# Patient Record
Sex: FEMALE | Race: WHITE | NOT HISPANIC OR LATINO | Employment: UNEMPLOYED | URBAN - METROPOLITAN AREA
[De-identification: names, ages, dates, MRNs, and addresses within clinical notes are randomized per-mention and may not be internally consistent; named-entity substitution may affect disease eponyms.]

---

## 2022-08-04 ENCOUNTER — SPECIALTY PHARMACY (OUTPATIENT)
Dept: PHARMACY | Facility: CLINIC | Age: 32
End: 2022-08-04

## 2022-08-04 NOTE — TELEPHONE ENCOUNTER
Crissy Obrien, this is Charity Villareal with [facility].  We are working on your prescription that your doctor has sent us. We will be working with your insurance to get this approved for you. We will be calling you along the way with updates on your medication.  If you have any questions, you can reach us at [phone].    Welcome call outcome: Left voicemail     PA questions answered, waiting on response

## 2022-08-05 NOTE — TELEPHONE ENCOUNTER
-PA approved from 08/04/2022 to 02/04/2023  Case ID: [id number]    Benefits Investigation      Insurance: Medicaid  Copay: $0.00

## 2022-08-08 ENCOUNTER — SPECIALTY PHARMACY (OUTPATIENT)
Dept: PHARMACY | Facility: CLINIC | Age: 32
End: 2022-08-08

## 2022-08-08 ENCOUNTER — PATIENT MESSAGE (OUTPATIENT)
Dept: PHARMACY | Facility: CLINIC | Age: 32
End: 2022-08-08

## 2022-08-08 DIAGNOSIS — M05.20 RHEUMATOID ARTERITIS: Primary | ICD-10-CM

## 2022-08-08 NOTE — TELEPHONE ENCOUNTER
Specialty Pharmacy - Initial Clinical Assessment    Specialty Medication Orders Linked to Encounter    Flowsheet Row Most Recent Value   Medication #1 adalimumab (HUMIRA,CF, PEN) 40 mg/0.4 mL PnKt (Order#[id number], Rx#[id number])        Patient Diagnosis   Rheumatoid arthritis    Subjective    Michela Beverly is a 31 y.o. female, who is followed by the specialty pharmacy service for management and education.    Recent Encounters     Date Type Provider Description    08/08/2022 Specialty Pharmacy Charity Villareal PharmD Initial Clinical Assessment    08/04/2022 Specialty Pharmacy Charity Villareal PharmD Referral Authorization        Clinical call attempts since last clinical assessment   8/5/2022  7:58 PM - Specialty Pharmacy - Clinical Assessment by Charity Villareal PharmD     Current Outpatient Medications   Medication Sig    adalimumab (HUMIRA,CF, PEN) 40 mg/0.4 mL PnKt Inject 0.4 mLs (40 mg total) into the skin every 14 (fourteen) days.    EScitalopram oxalate (LEXAPRO) 20 MG tablet Take 20 mg by mouth once daily.    ESTARYLLA 0.25-35 mg-mcg per tablet Take 1 tablet by mouth once daily.    folic acid (FOLVITE) 1 MG tablet Take 1 tablet (1 mg total) by mouth once daily.    GRALISE 600 mg Tb24 Take 1 tablet by mouth once daily.    methIMAzole (TAPAZOLE) 2.5 mg Tab     methotrexate 2.5 MG Tab Take 6 tablets (15 mg total) by mouth every 7 days.    naproxen (NAPROSYN) 500 MG tablet Take 1 tablet (500 mg total) by mouth 2 (two) times daily with meals.   Last reviewed on 8/8/2022  9:08 AM by Charity Villareal PharmD    Review of patient's allergies indicates:   Allergen Reactions    Lavender Hives and Shortness Of Breath   Last reviewed on  8/8/2022 9:08 AM by Charity Villareal    Drug Interactions    Drug interactions evaluated: yes  Clinically relevant drug interactions identified: no  Provided the patient with educational material regarding drug interactions: not applicable         Adverse Effects    Arthralgias: Pos  Back pain:  Pos  Gait problem: Pos  Joint swelling: Pos  Myalgias: Pos  Neck pain: Pos  Neck stiffness: Pos       Assessment Questions - Documented Responses    Flowsheet Row Most Recent Value   Assessment    Medication Reconciliation completed for patient Yes   During the past 4 weeks, has patient missed any activities due to condition or medication? Missed Work, Missed Planned Activities   Number of Days missed 4   During the past 4 weeks, did patient have any of the following urgent care visits? None   Goals of Therapy Status Discussed (new start)   Status of the patients ability to self-administer: Is Able   All education points have been covered with patient? Yes, supplemental printed education provided   Welcome packet contents reviewed and discussed with patient? Yes   Assesment completed? Yes   Plan Therapy being initiated   Do you need to open a clinical intervention (i-vent)? No   Do you want to schedule first shipment? Yes        Refill Questions - Documented Responses    Flowsheet Row Most Recent Value   Patient Availability and HIPAA Verification    Does patient want to proceed with activity? Yes   HIPAA/medical authority confirmed? Yes   Relationship to patient of person spoken to? Self   Refill Screening Questions    When does the patient need to receive the medication? 08/09/22   Refill Delivery Questions    How will the patient receive the medication? Mail   When does the patient need to receive the medication? 08/09/22   Shipping Address Home   Address in [facility] confirmed and updated if neccessary? Yes   Expected Copay ($) 0   Is the patient able to afford the medication copay? Yes   Payment Method zero copay   Days supply of Refill 28   Supplies needed? Alcohol Swabs   Refill activity completed? Yes   Refill activity plan Refill scheduled   Shipment/Pickup Date: 08/09/22          Objective    She has no past medical history on file.    Tried/failed medications: methotrexate, NSAID    BP Readings  "from Last 4 Encounters:   08/03/22 126/80   06/22/22 116/64     Ht Readings from Last 4 Encounters:   08/03/22 5' 4" (1.626 m)   06/22/22 5' 4" (1.626 m)     Wt Readings from Last 4 Encounters:   08/03/22 99.8 kg (220 lb)   06/22/22 104.3 kg (230 lb)     Recent Labs   Lab Result Units 08/03/22  1609 06/23/22  1402   Creatinine mg/dL 0.66 0.67   ALT U/L 26 20   AST U/L 14 12   Hepatitis B Surface Ag   --  Negative     The goals of rheumatoid arthritis treatment include:  · Relieving pain and suppressing inflammation  · Achieving remission and preventing joint and organ damage  · Increasing joint mobility and strength  · Preventing infection and other complications of treatment  · Reducing long term complications of rheumatoid arthritis  · Improving or maintaining physical function and optimal well-being  · Improving or maintaining quality of life  · Maintaining optimal therapy adherence  · Minimizing and managing side effects    Goals of Therapy Status: Discussed (new start)    Assessment/Plan  Patient plans to start therapy on 08/09/22      Indication, dosage, appropriateness, effectiveness, safety and convenience of her specialty medication(s) were reviewed today.     Patient Education   Patient received education on the following:    Expectations and possible outcomes of therapy   Proper use, timely administration, and missed dose management   Duration of therapy   Side effects, including prevention, minimization, and management   Contraindications and safety precautions   New or changed medications, including prescribe and over the counter medications and supplements   Reviews recommended vaccinations, as appropriate   Storage, safe handling, and disposal    Discussed all education points, patient is confident    Tasks added this encounter   8/30/2022 - Refill Call (Auto Added)  5/8/2023 - Clinical - Follow Up Assesement (Annual)   Tasks due within next 3 months   No tasks due.     Charity Villareal, " PharmD  [facility]  [address]  [facility] [address]  Phone: [phone]  Fax: [phone]

## 2022-08-30 ENCOUNTER — PATIENT MESSAGE (OUTPATIENT)
Dept: PHARMACY | Facility: CLINIC | Age: 32
End: 2022-08-30

## 2022-08-30 ENCOUNTER — SPECIALTY PHARMACY (OUTPATIENT)
Dept: PHARMACY | Facility: CLINIC | Age: 32
End: 2022-08-30

## 2022-08-30 NOTE — TELEPHONE ENCOUNTER
Specialty Pharmacy - Refill Coordination    Specialty Medication Orders Linked to Encounter      Flowsheet Row Most Recent Value   Medication #1 adalimumab (HUMIRA,CF, PEN) 40 mg/0.4 mL PnKt (Order#[id number], Rx#[id number])            Refill Questions - Documented Responses      Flowsheet Row Most Recent Value   Patient Availability and HIPAA Verification    Does patient want to proceed with activity? Yes   HIPAA/medical authority confirmed? Yes   Relationship to patient of person spoken to? Self   Refill Screening Questions    Changes to allergies? No   Changes to medications? No   New conditions since last clinic visit? No   Unplanned office visit, urgent care, ED, or hospital admission in the last 4 weeks? No   How does patient/caregiver feel medication is working? Good   Financial problems or insurance changes? No   How many doses of your specialty medications were missed in the last 4 weeks? 0   Would patient like to speak to a pharmacist? No   When does the patient need to receive the medication? 09/07/22   Refill Delivery Questions    How will the patient receive the medication? Delivery Ivette   When does the patient need to receive the medication? 09/07/22   Shipping Address Home   Address in [facility] confirmed and updated if neccessary? Yes   Expected Copay ($) 0   Is the patient able to afford the medication copay? Yes   Payment Method zero copay   Days supply of Refill 28   Supplies needed? No supplies needed   Refill activity completed? Yes   Refill activity plan Refill scheduled   Shipment/Pickup Date: 09/01/22            Current Outpatient Medications   Medication Sig    adalimumab (HUMIRA,CF, PEN) 40 mg/0.4 mL PnKt Inject 0.4 mLs (40 mg total) into the skin every 14 (fourteen) days.    EScitalopram oxalate (LEXAPRO) 20 MG tablet Take 20 mg by mouth once daily.    ESTARYLLA 0.25-35 mg-mcg per tablet Take 1 tablet by mouth once daily.    folic acid (FOLVITE) 1 MG tablet Take 1 tablet (1 mg  total) by mouth once daily.    GRALISE 600 mg Tb24 Take 1 tablet by mouth once daily.    methIMAzole (TAPAZOLE) 2.5 mg Tab     methotrexate 2.5 MG Tab Take 6 tablets (15 mg total) by mouth every 7 days.   Last reviewed on 8/8/2022  9:08 AM by Charity Villareal, Shyam    Review of patient's allergies indicates:   Allergen Reactions    Lavender Hives and Shortness Of Breath    Last reviewed on  8/8/2022 9:08 AM by Charity Villareal      Tasks added this encounter   9/28/2022 - Refill Call (Auto Added)   Tasks due within next 3 months   No tasks due.     Enrique Ryan, PharmD  [facility]  [address] [facility] [address]  Phone: [phone]  Fax: [phone]

## 2022-09-28 ENCOUNTER — SPECIALTY PHARMACY (OUTPATIENT)
Dept: PHARMACY | Facility: CLINIC | Age: 32
End: 2022-09-28

## 2022-09-28 ENCOUNTER — PATIENT MESSAGE (OUTPATIENT)
Dept: PHARMACY | Facility: CLINIC | Age: 32
End: 2022-09-28

## 2022-09-28 NOTE — TELEPHONE ENCOUNTER
Tj Obrien, this is Charity Villareal with [facility].  We are working on your prescription that your doctor has sent us. We will be working with your insurance to get this approved for you. We will be calling you along the way with updates on your medication.  If you have any questions, you can reach us at [phone].    Welcome call outcome: Patient/caregiver reached    Hand juwan ZAVALA due to insurance being medicaid

## 2022-09-28 NOTE — TELEPHONE ENCOUNTER
Specialty Pharmacy - Refill Coordination    Specialty Medication Orders Linked to Encounter      Flowsheet Row Most Recent Value   Medication #1 adalimumab (HUMIRA,CF, PEN) 40 mg/0.4 mL PnKt (Order#[id number], Rx#[id number])            Refill Questions - Documented Responses      Flowsheet Row Most Recent Value   Patient Availability and HIPAA Verification    Does patient want to proceed with activity? Yes   HIPAA/medical authority confirmed? Yes   Relationship to patient of person spoken to? Self   Refill Screening Questions    Changes to allergies? No   Changes to medications? No   New conditions since last clinic visit? No   Unplanned office visit, urgent care, ED, or hospital admission in the last 4 weeks? No   How does patient/caregiver feel medication is working? Good   Financial problems or insurance changes? No   How many doses of your specialty medications were missed in the last 4 weeks? 0   Would patient like to speak to a pharmacist? No   When does the patient need to receive the medication? 10/05/22   Refill Delivery Questions    How will the patient receive the medication? Mail   When does the patient need to receive the medication? 10/05/22   Shipping Address Home   Address in [facility] confirmed and updated if neccessary? Yes   Expected Copay ($) 0   Is the patient able to afford the medication copay? Yes   Payment Method zero copay   Days supply of Refill 28   Supplies needed? No supplies needed   Refill activity completed? Yes   Refill activity plan Refill scheduled   Shipment/Pickup Date: 09/29/22            Current Outpatient Medications   Medication Sig    adalimumab (HUMIRA,CF, PEN) 40 mg/0.4 mL PnKt Inject 0.4 mLs (40 mg total) into the skin every 14 (fourteen) days.    EScitalopram oxalate (LEXAPRO) 20 MG tablet Take 20 mg by mouth once daily.    ESTARYLLA 0.25-35 mg-mcg per tablet Take 1 tablet by mouth once daily.    folic acid (FOLVITE) 1 MG tablet Take 1 tablet (1 mg total) by  mouth once daily.    GRALISE 600 mg Tb24 Take 1 tablet by mouth once daily.    methIMAzole (TAPAZOLE) 2.5 mg Tab     methotrexate 2.5 MG Tab Take 6 tablets (15 mg total) by mouth every 7 days.   Last reviewed on 8/8/2022  9:08 AM by Charity Villareal, PharmD    Review of patient's allergies indicates:   Allergen Reactions    Lavender Hives and Shortness Of Breath    Last reviewed on  8/8/2022 9:08 AM by Charity Villareal      Tasks added this encounter   10/26/2022 - Refill Call (Auto Added)   Tasks due within next 3 months   No tasks due.     [facility]  [address] [facility] [address]  Phone: [phone]  Fax: [phone]

## 2022-09-30 NOTE — TELEPHONE ENCOUNTER
Incoming call from pt regarding Actemra. Next injection 10/5. Informed pt we are waiting to hear from insurance regarding PA. Pt understood and had no further questions at this time.

## 2022-10-03 NOTE — TELEPHONE ENCOUNTER
PA denied due to Actemra being non preferred on [facility] Medicaid's formulary. Patient would have to fail another preferred, Enbrel, or have a contraindication for Actemra to be covered. Messaging provider.

## 2022-10-04 NOTE — TELEPHONE ENCOUNTER
Specialty Pharmacy - Refill Coordination    Specialty Medication Orders Linked to Encounter      Flowsheet Row Most Recent Value   Medication #1 adalimumab (HUMIRA,CF, PEN) 40 mg/0.4 mL PnKt (Order#[id number], Rx#[id number])            Refill Questions - Documented Responses      Flowsheet Row Most Recent Value   Patient Availability and HIPAA Verification    Does patient want to proceed with activity? Yes   HIPAA/medical authority confirmed? Yes   Relationship to patient of person spoken to? Self   Refill Screening Questions    Changes to allergies? No   Changes to medications? Yes  [patient started  prn]   New conditions since last clinic visit? No   Unplanned office visit, urgent care, ED, or hospital admission in the last 4 weeks? No   How does patient/caregiver feel medication is working? Poor   Financial problems or insurance changes? No   How many doses of your specialty medications were missed in the last 4 weeks? 0   Would patient like to speak to a pharmacist? No   When does the patient need to receive the medication? 10/05/22   Refill Delivery Questions    How will the patient receive the medication? Mail   When does the patient need to receive the medication? 10/05/22   Shipping Address Home   Address in [facility] confirmed and updated if neccessary? Yes   Expected Copay ($) 0   Is the patient able to afford the medication copay? Yes   Payment Method zero copay   Days supply of Refill 28   Supplies needed? No supplies needed   Refill activity completed? Yes   Refill activity plan Refill scheduled   Shipment/Pickup Date: 10/04/22            Current Outpatient Medications   Medication Sig    adalimumab (HUMIRA,CF, PEN) 40 mg/0.4 mL PnKt Inject 0.4 mLs (40 mg total) into the skin every 14 (fourteen) days.    EScitalopram oxalate (LEXAPRO) 20 MG tablet Take 20 mg by mouth once daily.    ESTARYLLA 0.25-35 mg-mcg per tablet Take 1 tablet by mouth once daily.    folic acid (FOLVITE) 1 MG tablet  Take 1 tablet (1 mg total) by mouth once daily.    GRALISE 600 mg Tb24 Take 1 tablet by mouth once daily.    ibuprofen (ADVIL,MOTRIN) 600 MG tablet Take 600 mg by mouth every 6 (six) hours as needed.    methIMAzole (TAPAZOLE) 2.5 mg Tab     methotrexate 2.5 MG Tab Take 6 tablets (15 mg total) by mouth every 7 days.    tocilizumab (ACTEMRA ACTPEN) 162 mg/0.9 mL PnIj Inject 162 mg into the skin every 14 (fourteen) days.   Last reviewed on 9/28/2022  3:30 PM by Braulio Hylton RN    Review of patient's allergies indicates:   Allergen Reactions    Lavender Hives and Shortness Of Breath    Last reviewed on  10/4/2022 10:08 AM by Taty Hodge    Patient started IBU 600mg prn, knows not to take it on same day as methotrexate, doctor counseled on side effects/interaction  Tasks added this encounter   10/26/2022 - Refill Call (Auto Added)   Tasks due within next 3 months   No tasks due.     Charity Villareal, PharmD  [facility]  [address] [facility] [address] [address]  Phone: [phone]  Fax: [phone]

## 2022-10-06 NOTE — TELEPHONE ENCOUNTER
Incoming call from [facility]. Appeal approved from 10/4/22 to 4/6/23.   Auth #: [id number]    Routing to assigned pharmacist.

## 2022-10-06 NOTE — TELEPHONE ENCOUNTER
Checked status of appeal decision, still in process. Expected decision date is 10/7/22. Case ID appeal #: [id number]. If approved will close out [facility] enrollment.

## 2022-10-06 NOTE — TELEPHONE ENCOUNTER
Benefits Investigation      Insurance: Medicaid  Copay: $0    Will have to put in DUR codes to fill because Humira was filled on 10/4

## 2022-10-10 ENCOUNTER — SPECIALTY PHARMACY (OUTPATIENT)
Dept: PHARMACY | Facility: CLINIC | Age: 32
End: 2022-10-10

## 2022-10-10 DIAGNOSIS — M06.9 RHEUMATOID ARTHRITIS, INVOLVING UNSPECIFIED SITE, UNSPECIFIED WHETHER RHEUMATOID FACTOR PRESENT: Primary | ICD-10-CM

## 2022-10-10 NOTE — TELEPHONE ENCOUNTER
Specialty Pharmacy - Initial Clinical Assessment    Specialty Medication Orders Linked to Encounter      Flowsheet Row Most Recent Value   Medication #1 tocilizumab (ACTEMRA ACTPEN) 162 mg/0.9 mL PnIj (Order#[id number], Rx#[id number])          Patient Diagnosis   Rheumatoid Arthritis     Subjective    Michela Beverly is a 32 y.o. female, who is followed by the specialty pharmacy service for management and education.    Recent Encounters       Date Type Provider Description    10/10/2022 Specialty Pharmacy Charity Villareal PharmD Initial Clinical Assessment    09/28/2022 Specialty Pharmacy Charity Villareal PharmD Referral Authorization    09/28/2022 Specialty Pharmacy Awa Story-Emy Refill Coordination    08/30/2022 Specialty Pharmacy Enrique Ryan PharmD Refill Coordination    08/08/2022 Specialty Pharmacy Charity Villareal PharmD Initial Clinical Assessment          Clinical call attempts since last clinical assessment   10/6/2022  7:18 PM - Specialty Pharmacy - Clinical Assessment by Charity Villareal PharmD     Current Outpatient Medications   Medication Sig    ESTARYLLA 0.25-35 mg-mcg per tablet Take 1 tablet by mouth once daily.    folic acid (FOLVITE) 1 MG tablet Take 1 tablet (1 mg total) by mouth once daily.    ibuprofen (ADVIL,MOTRIN) 600 MG tablet Take 600 mg by mouth every 6 (six) hours as needed.    methIMAzole (TAPAZOLE) 2.5 mg Tab     methotrexate 2.5 MG Tab Take 6 tablets (15 mg total) by mouth every 7 days.    tocilizumab (ACTEMRA ACTPEN) 162 mg/0.9 mL PnIj Inject 1 pen (162 mg) into the skin every 14 (fourteen) days.   Last reviewed on 10/10/2022 10:45 AM by Charity Villareal PharmD    Review of patient's allergies indicates:   Allergen Reactions    Lavender Hives and Shortness Of Breath   Last reviewed on  10/10/2022 10:44 AM by Charity Villareal    Drug Interactions    Drug interactions evaluated: yes  Clinically relevant drug interactions identified: no  Provided the patient with educational material regarding drug  interactions: not applicable         Adverse Effects    Arthralgias: Pos  Gait problem: Pos  Joint swelling: Pos  Myalgias: Pos  Neck pain: Pos  Neck stiffness: Pos       Assessment Questions - Documented Responses      Flowsheet Row Most Recent Value   Assessment    Medication Reconciliation completed for patient Yes   During the past 4 weeks, has patient missed any activities due to condition or medication? Missed Work   Number of Days missed 2   During the past 4 weeks, did patient have any of the following urgent care visits? None   Goals of Therapy Status Discussed (new start)   Status of the patients ability to self-administer: Is Able   All education points have been covered with patient? Yes, supplemental printed education provided   Welcome packet contents reviewed and discussed with patient? Yes   Assesment completed? Yes   Plan Therapy being initiated   Do you need to open a clinical intervention (i-vent)? No   Do you want to schedule first shipment? Yes          Refill Questions - Documented Responses      Flowsheet Row Most Recent Value   Patient Availability and HIPAA Verification    Does patient want to proceed with activity? Yes   HIPAA/medical authority confirmed? Yes   Relationship to patient of person spoken to? Self   Refill Screening Questions    When does the patient need to receive the medication? 10/19/22   Refill Delivery Questions    How will the patient receive the medication? Mail   When does the patient need to receive the medication? 10/19/22   Shipping Address Home   Address in [facility] confirmed and updated if neccessary? Yes   Expected Copay ($) 0   Is the patient able to afford the medication copay? Yes   Payment Method zero copay   Days supply of Refill 28   Supplies needed? No supplies needed   Refill activity completed? Yes   Refill activity plan Refill scheduled   Shipment/Pickup Date: 10/12/22            Objective    She has no past medical history on  "file.    Tried/failed medications: Humira, methotrexate    BP Readings from Last 4 Encounters:   09/28/22 127/68   08/03/22 126/80   06/22/22 116/64     Ht Readings from Last 4 Encounters:   09/28/22 5' 4" (1.626 m)   08/03/22 5' 4" (1.626 m)   06/22/22 5' 4" (1.626 m)     Wt Readings from Last 4 Encounters:   09/28/22 94.1 kg (207 lb 6.4 oz)   08/03/22 99.8 kg (220 lb)   06/22/22 104.3 kg (230 lb)     Recent Labs   Lab Result Units 09/28/22  1626 08/03/22  1609   Creatinine mg/dL 0.71 0.66   ALT U/L 23 26   AST U/L 17 14     The goals of rheumatoid arthritis treatment include:  Relieving pain and suppressing inflammation  Achieving remission and preventing joint and organ damage  Increasing joint mobility and strength  Preventing infection and other complications of treatment  Reducing long term complications of rheumatoid arthritis  Improving or maintaining physical function and optimal well-being  Improving or maintaining quality of life  Maintaining optimal therapy adherence  Minimizing and managing side effects    Goals of Therapy Status: Discussed (new start)    Assessment/Plan  Patient plans to start therapy on 10/19/22      Indication, dosage, appropriateness, effectiveness, safety and convenience of her specialty medication(s) were reviewed today.     Patient Education   Patient received education on the following:   Expectations and possible outcomes of therapy  Proper use, timely administration, and missed dose management  Duration of therapy  Side effects, including prevention, minimization, and management  Contraindications and safety precautions  New or changed medications, including prescribe and over the counter medications and supplements  Reviews recommended vaccinations, as appropriate  Storage, safe handling, and disposal    Went over all education points, patient is confident, first dose will be on 10/19/22, which is 2 weeks after her last humira dose.      Tasks added this encounter   No tasks " added.   Tasks due within next 3 months   10/6/2022 - Clinical - Initial Assessment     Charity Villareal, PharmD  [facility]  [address]  [facility] [address]  Phone: [phone]  Fax: [phone]

## 2022-11-09 ENCOUNTER — SPECIALTY PHARMACY (OUTPATIENT)
Dept: PHARMACY | Facility: CLINIC | Age: 32
End: 2022-11-09

## 2022-11-09 NOTE — TELEPHONE ENCOUNTER
Specialty Pharmacy - Refill Coordination    Specialty Medication Orders Linked to Encounter      Flowsheet Row Most Recent Value   Medication #1 tocilizumab (ACTEMRA ACTPEN) 162 mg/0.9 mL PnIj (Order#[id number], Rx#[id number])            Refill Questions - Documented Responses      Flowsheet Row Most Recent Value   Patient Availability and HIPAA Verification    Does patient want to proceed with activity? Yes   HIPAA/medical authority confirmed? Yes   Relationship to patient of person spoken to? Self   Refill Screening Questions    Changes to allergies? No   Changes to medications? No   New conditions since last clinic visit? No   Unplanned office visit, urgent care, ED, or hospital admission in the last 4 weeks? No   How does patient/caregiver feel medication is working? Good   Financial problems or insurance changes? No   How many doses of your specialty medications were missed in the last 4 weeks? 0   Would patient like to speak to a pharmacist? No   When does the patient need to receive the medication? 11/16/22   Refill Delivery Questions    How will the patient receive the medication? Mail   When does the patient need to receive the medication? 11/16/22   Shipping Address Home   Address in [facility] confirmed and updated if neccessary? Yes   Expected Copay ($) 0   Is the patient able to afford the medication copay? Yes   Payment Method zero copay   Days supply of Refill 28   Supplies needed? No supplies needed   Refill activity completed? Yes   Refill activity plan Refill scheduled   Shipment/Pickup Date: 11/14/22            Current Outpatient Medications   Medication Sig    ESTARYLLA 0.25-35 mg-mcg per tablet Take 1 tablet by mouth once daily.    folic acid (FOLVITE) 1 MG tablet Take 1 tablet (1 mg total) by mouth once daily.    ibuprofen (ADVIL,MOTRIN) 600 MG tablet Take 600 mg by mouth every 6 (six) hours as needed.    methIMAzole (TAPAZOLE) 2.5 mg Tab     methotrexate 2.5 MG Tab Take 6 tablets (15 mg  total) by mouth every 7 days.    tocilizumab (ACTEMRA ACTPEN) 162 mg/0.9 mL PnIj Inject 1 pen (162 mg) into the skin every 14 (fourteen) days.   Last reviewed on 10/10/2022 10:45 AM by Charity Villareal, PharmD    Review of patient's allergies indicates:   Allergen Reactions    Lavender Hives and Shortness Of Breath    Last reviewed on  10/10/2022 10:44 AM by Charity Villareal      Tasks added this encounter   12/7/2022 - Refill Call (Auto Added)   Tasks due within next 3 months   No tasks due.     [facility]  [address] [facility] [address]  Phone: [phone]  Fax: [phone]

## 2022-12-07 ENCOUNTER — SPECIALTY PHARMACY (OUTPATIENT)
Dept: PHARMACY | Facility: CLINIC | Age: 32
End: 2022-12-07

## 2022-12-07 NOTE — TELEPHONE ENCOUNTER
Outgoing call regarding actemra refill; per pt, she's due to inject on 12/14; informed her that a refill request was sent to md, and once approved [facility] will follow up to schedule delivery

## 2022-12-12 NOTE — TELEPHONE ENCOUNTER
Specialty Pharmacy - Refill Coordination    Specialty Medication Orders Linked to Encounter      Flowsheet Row Most Recent Value   Medication #1 tocilizumab (ACTEMRA ACTPEN) 162 mg/0.9 mL PnIj (Order#[id number], Rx#[id number])            Refill Questions - Documented Responses      Flowsheet Row Most Recent Value   Patient Availability and HIPAA Verification    Does patient want to proceed with activity? Yes   HIPAA/medical authority confirmed? Yes   Relationship to patient of person spoken to? Self   Refill Screening Questions    Changes to allergies? No   Changes to medications? No   New conditions since last clinic visit? No   Unplanned office visit, urgent care, ED, or hospital admission in the last 4 weeks? No   How does patient/caregiver feel medication is working? Excellent   Financial problems or insurance changes? No   How many doses of your specialty medications were missed in the last 4 weeks? 0   Would patient like to speak to a pharmacist? No   When does the patient need to receive the medication? 12/14/22   Refill Delivery Questions    How will the patient receive the medication? Mail   When does the patient need to receive the medication? 12/14/22   Shipping Address Home   Address in [facility] confirmed and updated if neccessary? Yes   Expected Copay ($) 0   Is the patient able to afford the medication copay? Yes   Payment Method zero copay   Days supply of Refill 28   Supplies needed? No supplies needed   Refill activity completed? Yes   Refill activity plan Refill scheduled   Shipment/Pickup Date: 12/12/22            Current Outpatient Medications   Medication Sig    ESTARYLLA 0.25-35 mg-mcg per tablet Take 1 tablet by mouth once daily.    folic acid (FOLVITE) 1 MG tablet Take 1 tablet (1 mg total) by mouth once daily.    ibuprofen (ADVIL,MOTRIN) 600 MG tablet Take 600 mg by mouth every 6 (six) hours as needed.    methIMAzole (TAPAZOLE) 2.5 mg Tab     methotrexate 2.5 MG Tab Take 8 tablets  (20 mg total) by mouth every 7 days.    tocilizumab (ACTEMRA ACTPEN) 162 mg/0.9 mL PnIj Inject 1 pen (162 mg) into the skin every 14 (fourteen) days.   Last reviewed on 11/21/2022  2:00 PM by German Graham LPN    Review of patient's allergies indicates:   Allergen Reactions    Lavender Hives and Shortness Of Breath    Last reviewed on  11/21/2022 2:00 PM by German Graham      Tasks added this encounter   1/4/2023 - Refill Call (Auto Added)   Tasks due within next 3 months   No tasks due.     Demi Landers, PharmD  [facility]  [address] [facility] [address]  Phone: [phone]  Fax: [phone]

## 2023-01-05 ENCOUNTER — SPECIALTY PHARMACY (OUTPATIENT)
Dept: PHARMACY | Facility: CLINIC | Age: 33
End: 2023-01-05

## 2023-01-05 NOTE — TELEPHONE ENCOUNTER
Specialty Pharmacy - Refill Coordination    Specialty Medication Orders Linked to Encounter      Flowsheet Row Most Recent Value   Medication #1 tocilizumab (ACTEMRA ACTPEN) 162 mg/0.9 mL PnIj (Order#[id number], Rx#[id number])            Refill Questions - Documented Responses      Flowsheet Row Most Recent Value   Patient Availability and HIPAA Verification    Does patient want to proceed with activity? Yes   HIPAA/medical authority confirmed? Yes   Relationship to patient of person spoken to? Self   Refill Screening Questions    Changes to allergies? No   Changes to medications? No   New conditions since last clinic visit? No   Unplanned office visit, urgent care, ED, or hospital admission in the last 4 weeks? No   How does patient/caregiver feel medication is working? Good   Financial problems or insurance changes? No   How many doses of your specialty medications were missed in the last 4 weeks? 0   Would patient like to speak to a pharmacist? No   When does the patient need to receive the medication? 01/11/23   Refill Delivery Questions    How will the patient receive the medication? Mail   When does the patient need to receive the medication? 01/11/23   Shipping Address Home   Address in [facility] confirmed and updated if neccessary? Yes   Expected Copay ($) 0   Is the patient able to afford the medication copay? Yes   Payment Method zero copay   Days supply of Refill 28   Supplies needed? No supplies needed   Refill activity completed? Yes   Refill activity plan Refill scheduled   Shipment/Pickup Date: 01/09/23            Current Outpatient Medications   Medication Sig    ESTARYLLA 0.25-35 mg-mcg per tablet Take 1 tablet by mouth once daily.    folic acid (FOLVITE) 1 MG tablet Take 1 tablet (1 mg total) by mouth once daily.    ibuprofen (ADVIL,MOTRIN) 600 MG tablet Take 600 mg by mouth every 6 (six) hours as needed.    methIMAzole (TAPAZOLE) 2.5 mg Tab     methotrexate 2.5 MG Tab Take 8 tablets (20 mg  total) by mouth every 7 days.    tocilizumab (ACTEMRA ACTPEN) 162 mg/0.9 mL PnIj Inject 1 pen (162 mg) into the skin every 14 (fourteen) days.   Last reviewed on 11/21/2022  2:00 PM by German Graham LPN    Review of patient's allergies indicates:   Allergen Reactions    Lavender Hives and Shortness Of Breath    Last reviewed on  11/21/2022 2:00 PM by German Graham      Tasks added this encounter   2/1/2023 - Refill Call (Auto Added)   Tasks due within next 3 months   No tasks due.     Art[facility] Caicedo [facility] - [facility] Pharmacy  [address] [facility] [address]  Phone: [phone]  Fax: [phone]

## 2023-02-01 ENCOUNTER — SPECIALTY PHARMACY (OUTPATIENT)
Dept: PHARMACY | Facility: CLINIC | Age: 33
End: 2023-02-01

## 2023-02-01 NOTE — TELEPHONE ENCOUNTER
Specialty Pharmacy - Refill Coordination    Specialty Medication Orders Linked to Encounter      Flowsheet Row Most Recent Value   Medication #1 tocilizumab (ACTEMRA ACTPEN) 162 mg/0.9 mL PnIj (Order#[id number], Rx#[id number])            Refill Questions - Documented Responses      Flowsheet Row Most Recent Value   Patient Availability and HIPAA Verification    Does patient want to proceed with activity? Yes   HIPAA/medical authority confirmed? Yes   Relationship to patient of person spoken to? Self   Refill Screening Questions    Changes to allergies? No   Changes to medications? No   New conditions since last clinic visit? No   Unplanned office visit, urgent care, ED, or hospital admission in the last 4 weeks? No   How does patient/caregiver feel medication is working? Good   Financial problems or insurance changes? No   How many doses of your specialty medications were missed in the last 4 weeks? 0   Would patient like to speak to a pharmacist? Yes   When does the patient need to receive the medication? 02/08/23   Refill Delivery Questions    How will the patient receive the medication? Mail   When does the patient need to receive the medication? 02/08/23   Shipping Address Home   Address in [facility] confirmed and updated if neccessary? Yes   Expected Copay ($) 0   Is the patient able to afford the medication copay? Yes   Payment Method zero copay   Days supply of Refill 28   Supplies needed? No supplies needed   Refill activity completed? Yes   Refill activity plan Refill scheduled   Shipment/Pickup Date: 02/06/23            Current Outpatient Medications   Medication Sig    ESTARYLLA 0.25-35 mg-mcg per tablet Take 1 tablet by mouth once daily.    folic acid (FOLVITE) 1 MG tablet Take 1 tablet (1 mg total) by mouth once daily.    ibuprofen (ADVIL,MOTRIN) 600 MG tablet Take 600 mg by mouth every 6 (six) hours as needed.    methIMAzole (TAPAZOLE) 2.5 mg Tab     methotrexate 2.5 MG Tab Take 8 tablets (20  mg total) by mouth every 7 days.    tocilizumab (ACTEMRA ACTPEN) 162 mg/0.9 mL PnIj Inject 1 pen (162 mg) into the skin every 14 (fourteen) days.   Last reviewed on 11/21/2022  2:00 PM by German Graham LPN    Review of patient's allergies indicates:   Allergen Reactions    Lavender Hives and Shortness Of Breath    Last reviewed on  11/21/2022 2:00 PM by German Graham      Tasks added this encounter   No tasks added.   Tasks due within next 3 months   2/1/2023 - Refill Call (Auto Added)     JULIO CESAR BROWN, PharmD  [facility]  [address] [facility] [address]  Phone: [phone]  Fax: [phone]

## 2023-03-01 ENCOUNTER — SPECIALTY PHARMACY (OUTPATIENT)
Dept: PHARMACY | Facility: CLINIC | Age: 33
End: 2023-03-01

## 2023-03-01 NOTE — TELEPHONE ENCOUNTER
Outgoing call regarding actemra refill; per pt, she's due to inject on 3/8;  pt reported UC/ER visit, and also being on antibiotics; transferred to [facility]

## 2023-03-01 NOTE — TELEPHONE ENCOUNTER
Specialty Pharmacy - Refill Coordination    Specialty Medication Orders Linked to Encounter      Flowsheet Row Most Recent Value   Medication #1 tocilizumab (ACTEMRA ACTPEN) 162 mg/0.9 mL PnIj (Order#[id number], Rx#[id number])            Refill Questions - Documented Responses      Flowsheet Row Most Recent Value   Patient Availability and HIPAA Verification    Does patient want to proceed with activity? Yes   HIPAA/medical authority confirmed? Yes   Relationship to patient of person spoken to? Self   Refill Screening Questions    Changes to allergies? No   Changes to medications? Yes   New conditions since last clinic visit? No   Unplanned office visit, urgent care, ED, or hospital admission in the last 4 weeks? Yes   Financial problems or insurance changes? No   How many doses of your specialty medications were missed in the last 4 weeks? 0   Would patient like to speak to a pharmacist? No   When does the patient need to receive the medication? 03/08/23   Refill Delivery Questions    How will the patient receive the medication? Mail   When does the patient need to receive the medication? 03/08/23   Shipping Address Home   Address in [facility] confirmed and updated if neccessary? Yes   Expected Copay ($) 0   Is the patient able to afford the medication copay? Yes   Payment Method zero copay   Days supply of Refill 28   Supplies needed? Alcohol Swabs   Refill activity completed? Yes   Refill activity plan Refill scheduled   Shipment/Pickup Date: 03/06/23            Current Outpatient Medications   Medication Sig    ESTARYLLA 0.25-35 mg-mcg per tablet Take 1 tablet by mouth once daily.    folic acid (FOLVITE) 1 MG tablet Take 1 tablet (1 mg total) by mouth once daily.    ibuprofen (ADVIL,MOTRIN) 600 MG tablet Take 600 mg by mouth every 6 (six) hours as needed.    methIMAzole (TAPAZOLE) 2.5 mg Tab     methotrexate 2.5 MG Tab Take 8 tablets (20 mg total) by mouth every 7 days.    tocilizumab (ACTEMRA ACTPEN)  162 mg/0.9 mL PnIj Inject 1 pen (162 mg) into the skin every 14 (fourteen) days.   Last reviewed on 11/21/2022  2:00 PM by German Graham LPN    Review of patient's allergies indicates:   Allergen Reactions    Lavender Hives and Shortness Of Breath    Last reviewed on  11/21/2022 2:00 PM by German Graham      Tasks added this encounter   3/29/2023 - Refill Call (Auto Added)   Tasks due within next 3 months   No tasks due.     Abbi Landers, PharmD  [facility]  [address] [facility] [address]  Phone: [phone]  Fax: [phone]

## 2023-03-29 ENCOUNTER — SPECIALTY PHARMACY (OUTPATIENT)
Dept: PHARMACY | Facility: CLINIC | Age: 33
End: 2023-03-29

## 2023-03-29 NOTE — TELEPHONE ENCOUNTER
Specialty Pharmacy - Refill Coordination    Specialty Medication Orders Linked to Encounter      Flowsheet Row Most Recent Value   Medication #1 tocilizumab (ACTEMRA ACTPEN) 162 mg/0.9 mL PnIj (Order#[id number], Rx#[id number])            Refill Questions - Documented Responses      Flowsheet Row Most Recent Value   Patient Availability and HIPAA Verification    Does patient want to proceed with activity? Yes   HIPAA/medical authority confirmed? Yes   Relationship to patient of person spoken to? Self   Refill Screening Questions    Changes to allergies? No   Changes to medications? No   New conditions since last clinic visit? No   Unplanned office visit, urgent care, ED, or hospital admission in the last 4 weeks? Yes  [urgent care- ear infection]   How does patient/caregiver feel medication is working? Good   Financial problems or insurance changes? No   How many doses of your specialty medications were missed in the last 4 weeks? 0   Would patient like to speak to a pharmacist? No   When does the patient need to receive the medication? 04/05/23   Refill Delivery Questions    How will the patient receive the medication? Mail   When does the patient need to receive the medication? 04/05/23   Shipping Address Home   Address in [facility] confirmed and updated if neccessary? Yes   Expected Copay ($) 0   Is the patient able to afford the medication copay? Yes   Payment Method zero copay   Days supply of Refill 28   Supplies needed? No supplies needed   Refill activity completed? Yes   Refill activity plan Refill scheduled   Shipment/Pickup Date: 04/03/23            Current Outpatient Medications   Medication Sig    ESTARYLLA 0.25-35 mg-mcg per tablet Take 1 tablet by mouth once daily.    folic acid (FOLVITE) 1 MG tablet Take 1 tablet (1 mg total) by mouth once daily.    ibuprofen (ADVIL,MOTRIN) 600 MG tablet Take 600 mg by mouth every 6 (six) hours as needed.    methIMAzole (TAPAZOLE) 2.5 mg Tab     methotrexate  2.5 MG Tab Take 8 tablets (20 mg total) by mouth every 7 days.    tocilizumab (ACTEMRA ACTPEN) 162 mg/0.9 mL PnIj Inject 1 pen (162 mg) into the skin every 14 (fourteen) days.   Last reviewed on 11/21/2022  2:00 PM by German Graham LPN    Review of patient's allergies indicates:   Allergen Reactions    Lavender Hives and Shortness Of Breath    Last reviewed on  11/21/2022 2:00 PM by German Graham      Tasks added this encounter   4/26/2023 - Refill Call (Auto Added)   Tasks due within next 3 months   No tasks due.     [facility]  [address] [facility] [address]  Phone: [phone]  Fax: [phone]

## 2023-04-19 ENCOUNTER — SPECIALTY PHARMACY (OUTPATIENT)
Dept: PHARMACY | Facility: CLINIC | Age: 33
End: 2023-04-19

## 2023-04-19 NOTE — TELEPHONE ENCOUNTER
Outgoing call to notify patient that Actemra every 7 days order was received but requires PA on insurance. Patient's last dose was 4/12. I will submit urgent PA and follow-up when I receive decision from insurance. Patient verbalized understanding.

## 2023-04-19 NOTE — TELEPHONE ENCOUNTER
Incoming call from patient for Actemra refill. Patient stated her prescription was changed to once weekly dosing and she is due for an injection today. Per chart review, new RX with updated frequency was sent to print status. Informed patient of this and refill request sent to office. Patient will contact office as well. Routing to assigned [facility] to follow up.

## 2023-04-19 NOTE — TELEPHONE ENCOUNTER
Tj ZAVALA approved from 4/19/23 to 4/19/24  Case ID: [id number]    Benefits Investigation   Insurance: Medicaid  Copay: $0    Ok to proceed with refill.

## 2023-04-20 NOTE — TELEPHONE ENCOUNTER
Patient advised to inject as soon as received, dose was due 4/19/23. Patient normal injection days were every other Wednesday, will proceed with injection every Wednesday moving forward. Counseled patient on updated weekly dosing.    Specialty Pharmacy - Refill Coordination  Specialty Pharmacy - Medication/Referral Authorization    Specialty Medication Orders Linked to Encounter      Flowsheet Row Most Recent Value   Medication #1 tocilizumab (ACTEMRA ACTPEN) 162 mg/0.9 mL PnIj (Order#[id number], Rx#)            Refill Questions - Documented Responses      Flowsheet Row Most Recent Value   Patient Availability and HIPAA Verification    Does patient want to proceed with activity? Yes   HIPAA/medical authority confirmed? Yes   Relationship to patient of person spoken to? Self   Refill Screening Questions    Changes to allergies? No   Changes to medications? No   New conditions since last clinic visit? No   Unplanned office visit, urgent care, ED, or hospital admission in the last 4 weeks? No   How does patient/caregiver feel medication is working? Very good   Financial problems or insurance changes? No   How many doses of your specialty medications were missed in the last 4 weeks? 0   Would patient like to speak to a pharmacist? No   When does the patient need to receive the medication? 04/21/23   Refill Delivery Questions    How will the patient receive the medication? Mail   When does the patient need to receive the medication? 04/21/23   Shipping Address Home   Address in [facility] confirmed and updated if neccessary? Yes   Expected Copay ($) 0   Is the patient able to afford the medication copay? Yes   Payment Method zero copay   Days supply of Refill 28   Supplies needed? No supplies needed   Refill activity completed? Yes   Refill activity plan Refill scheduled   Shipment/Pickup Date: 04/20/23            Current Outpatient Medications   Medication Sig    ESTARYLLA 0.25-35 mg-mcg per tablet Take 1 tablet  by mouth once daily.    folic acid (FOLVITE) 1 MG tablet Take 1 tablet (1 mg total) by mouth once daily.    ibuprofen (ADVIL,MOTRIN) 600 MG tablet Take 600 mg by mouth every 6 (six) hours as needed.    methIMAzole (TAPAZOLE) 2.5 mg Tab     methotrexate 2.5 MG Tab Take 8 tablets (20 mg total) by mouth every 7 days.    tocilizumab (ACTEMRA ACTPEN) 162 mg/0.9 mL PnIj Inject 162 mg into the skin every 7 days.   Last reviewed on 4/5/2023  2:12 PM by Colette Zamarripa MA    Review of patient's allergies indicates:   Allergen Reactions    Lavender Hives and Shortness Of Breath    Last reviewed on  4/19/2023 11:09 AM by Taty Hodge    Interventions added this encounter   Closed: [facility] Patient Intervention - Drug therapy effectiveness: tocilizumab (ACTEMRA ACTPEN) 162 mg/0.9 mL PnIj     Tasks added this encounter   5/12/2023 - Refill Call (Auto Added)   Tasks due within next 3 months   7/10/2023 - Clinical - Follow Up Assesement (Annual)     Alesha Esparza, PharmD  [facility]  [address]  [facility] [address]  Phone: [phone]  Fax: [phone]

## 2023-05-12 ENCOUNTER — SPECIALTY PHARMACY (OUTPATIENT)
Dept: PHARMACY | Facility: CLINIC | Age: 33
End: 2023-05-12

## 2023-05-12 NOTE — TELEPHONE ENCOUNTER
Specialty Pharmacy - Refill Coordination    Specialty Medication Orders Linked to Encounter      Flowsheet Row Most Recent Value   Medication #1 tocilizumab (ACTEMRA ACTPEN) 162 mg/0.9 mL PnIj (Order#[id number], Rx#[id number])            Refill Questions - Documented Responses      Flowsheet Row Most Recent Value   Patient Availability and HIPAA Verification    Does patient want to proceed with activity? Yes   HIPAA/medical authority confirmed? Yes   Relationship to patient of person spoken to? Self   Refill Screening Questions    Changes to allergies? No   Changes to medications? No   New conditions since last clinic visit? No   Unplanned office visit, urgent care, ED, or hospital admission in the last 4 weeks? No   How does patient/caregiver feel medication is working? Good   Financial problems or insurance changes? No   How many doses of your specialty medications were missed in the last 4 weeks? 0   Would patient like to speak to a pharmacist? No   When does the patient need to receive the medication? 05/17/23   Refill Delivery Questions    How will the patient receive the medication? Mail   When does the patient need to receive the medication? 05/17/23   Shipping Address Home   Address in [facility] confirmed and updated if neccessary? Yes   Expected Copay ($) 0   Is the patient able to afford the medication copay? Yes   Payment Method zero copay   Days supply of Refill 28   Supplies needed? No supplies needed   Refill activity completed? Yes   Refill activity plan Refill scheduled   Shipment/Pickup Date: 05/15/23            Current Outpatient Medications   Medication Sig    ESTARYLLA 0.25-35 mg-mcg per tablet Take 1 tablet by mouth once daily.    folic acid (FOLVITE) 1 MG tablet Take 1 tablet (1 mg total) by mouth once daily.    ibuprofen (ADVIL,MOTRIN) 600 MG tablet Take 600 mg by mouth every 6 (six) hours as needed.    methIMAzole (TAPAZOLE) 2.5 mg Tab     methotrexate 2.5 MG Tab Take 8 tablets (20 mg  total) by mouth every 7 days.    tocilizumab (ACTEMRA ACTPEN) 162 mg/0.9 mL PnIj Inject 162 mg into the skin every 7 days.   Last reviewed on 4/5/2023  2:12 PM by Colette Zamarripa MA    Review of patient's allergies indicates:   Allergen Reactions    Lavender Hives and Shortness Of Breath    Last reviewed on  4/19/2023 11:09 AM by Taty Hodge      Tasks added this encounter   No tasks added.   Tasks due within next 3 months   5/12/2023 - Refill Coordination Outreach (1 time occurrence)  7/10/2023 - Clinical Assessment (1 year recurrence)     [facility] - Special[facility] Pharmacy  [address] [facility] [address]  Phone: [phone]  Fax: [phone]

## 2023-06-05 ENCOUNTER — SPECIALTY PHARMACY (OUTPATIENT)
Dept: PHARMACY | Facility: CLINIC | Age: 33
End: 2023-06-05

## 2023-06-05 NOTE — TELEPHONE ENCOUNTER
Outgoing call regarding actemra refill; per pt, she's due to inject on 6/7; pt reported ER/UC visit; transferred to [facility]

## 2023-06-05 NOTE — TELEPHONE ENCOUNTER
Specialty Pharmacy - Refill Coordination    Specialty Medication Orders Linked to Encounter      Flowsheet Row Most Recent Value   Medication #1 tocilizumab (ACTEMRA ACTPEN) 162 mg/0.9 mL PnIj (Order#[id number], Rx#[id number])        Patient went to urgent care for pink eye, was given antibiotics drops. Infection resolved, no complications. Ok to continue Actemra.    Refill Questions - Documented Responses      Flowsheet Row Most Recent Value   Patient Availability and HIPAA Verification    Does patient want to proceed with activity? Yes   HIPAA/medical authority confirmed? Yes   Relationship to patient of person spoken to? Self   Refill Screening Questions    Changes to allergies? No   Changes to medications? No   New conditions since last clinic visit? No   Unplanned office visit, urgent care, ED, or hospital admission in the last 4 weeks? Yes   How does patient/caregiver feel medication is working? Very good   Financial problems or insurance changes? No   How many doses of your specialty medications were missed in the last 4 weeks? 0   Would patient like to speak to a pharmacist? No   When does the patient need to receive the medication? 06/07/23   Refill Delivery Questions    How will the patient receive the medication? Mail   When does the patient need to receive the medication? 06/07/23   Shipping Address Home   Address in [facility] confirmed and updated if neccessary? Yes   Expected Copay ($) 0   Is the patient able to afford the medication copay? Yes   Payment Method zero copay   Days supply of Refill 28   Supplies needed? No supplies needed   Refill activity completed? Yes   Refill activity plan Refill scheduled   Shipment/Pickup Date: 06/06/23            Current Outpatient Medications   Medication Sig    ESTARYLLA 0.25-35 mg-mcg per tablet Take 1 tablet by mouth once daily.    folic acid (FOLVITE) 1 MG tablet TAKE 1 TABLET(1 MG) BY MOUTH EVERY DAY    ibuprofen (ADVIL,MOTRIN) 600 MG tablet Take 600  mg by mouth every 6 (six) hours as needed.    methIMAzole (TAPAZOLE) 2.5 mg Tab     methotrexate 2.5 MG Tab Take 10 tablets (25 mg total) by mouth every 7 days.    tocilizumab (ACTEMRA ACTPEN) 162 mg/0.9 mL PnIj Inject 162 mg into the skin every 7 days.   Last reviewed on 5/17/2023  2:38 PM by Colette Zamarripa MA    Review of patient's allergies indicates:   Allergen Reactions    Lavender Hives and Shortness Of Breath    Last reviewed on  5/17/2023 2:38 PM by Colette Zamarripa      Tasks added this encounter   No tasks added.   Tasks due within next 3 months   7/10/2023 - Clinical Assessment (1 year recurrence)  6/5/2023 - Refill Coordination Outreach (1 time occurrence)     Abbi Landers, PharmD  [facility]  [address] [place] [id number]  Phone: [phone]  Fax: [phone]

## 2023-06-21 ENCOUNTER — SPECIALTY PHARMACY (OUTPATIENT)
Dept: PHARMACY | Facility: CLINIC | Age: 33
End: 2023-06-21
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
## 2023-06-26 NOTE — TELEPHONE ENCOUNTER
Specialty Pharmacy - Refill Coordination    Specialty Medication Orders Linked to Encounter      Flowsheet Row Most Recent Value   Medication #1 tocilizumab (ACTEMRA ACTPEN) 162 mg/0.9 mL PnIj (Order#[id number], Rx#[id number])            Refill Questions - Documented Responses      Flowsheet Row Most Recent Value   Patient Availability and HIPAA Verification    Does patient want to proceed with activity? Yes   HIPAA/medical authority confirmed? Yes   Relationship to patient of person spoken to? Self   Refill Screening Questions    Changes to allergies? No   Changes to medications? No   New conditions since last clinic visit? No   Unplanned office visit, urgent care, ED, or hospital admission in the last 4 weeks? No   How does patient/caregiver feel medication is working? Good   Financial problems or insurance changes? No   How many doses of your specialty medications were missed in the last 4 weeks? 0   Would patient like to speak to a pharmacist? No   When does the patient need to receive the medication? 06/28/23   Refill Delivery Questions    How will the patient receive the medication? Mail   When does the patient need to receive the medication? 06/28/23   Shipping Address Home   Address in [facility] confirmed and updated if neccessary? Yes   Expected Copay ($) 0   Is the patient able to afford the medication copay? Yes   Payment Method zero copay   Days supply of Refill 28   Supplies needed? No supplies needed   Refill activity completed? Yes   Refill activity plan Refill scheduled   Shipment/Pickup Date: 06/26/23            Current Outpatient Medications   Medication Sig    ESTARYLLA 0.25-35 mg-mcg per tablet Take 1 tablet by mouth once daily.    folic acid (FOLVITE) 1 MG tablet TAKE 1 TABLET(1 MG) BY MOUTH EVERY DAY    ibuprofen (ADVIL,MOTRIN) 600 MG tablet Take 600 mg by mouth every 6 (six) hours as needed.    methIMAzole (TAPAZOLE) 2.5 mg Tab     methotrexate 2.5 MG Tab Take 10 tablets (25 mg total)  by mouth every 7 days.    tocilizumab (ACTEMRA ACTPEN) 162 mg/0.9 mL PnIj Inject 162 mg into the skin every 7 days.   Last reviewed on 5/17/2023  2:38 PM by Colette Zamarripa MA    Review of patient's allergies indicates:   Allergen Reactions    Lavender Hives and Shortness Of Breath    Last reviewed on  5/17/2023 2:38 PM by Colette Zamarripa      Tasks added this encounter   No tasks added.   Tasks due within next 3 months   7/10/2023 - Clinical Assessment (1 year recurrence)  6/29/2023 - Refill Coordination Outreach (1 time occurrence)     Linda Theodore, PharmD  [facility]  [address] [place] [id number]  Phone: [phone]  Fax: [phone]

## 2023-08-17 ENCOUNTER — SPECIALTY PHARMACY (OUTPATIENT)
Dept: PHARMACY | Facility: CLINIC | Age: 33
End: 2023-08-17

## 2023-08-17 NOTE — TELEPHONE ENCOUNTER
Specialty Pharmacy - Refill Coordination    Specialty Medication Orders Linked to Encounter      Flowsheet Row Most Recent Value   Medication #1 tocilizumab (ACTEMRA ACTPEN) 162 mg/0.9 mL PnIj (Order#[id number], Rx#[id number])            Refill Questions - Documented Responses      Flowsheet Row Most Recent Value   Patient Availability and HIPAA Verification    Does patient want to proceed with activity? Yes   HIPAA/medical authority confirmed? Yes   Relationship to patient of person spoken to? Self   Refill Screening Questions    Changes to allergies? No   Changes to medications? No   New conditions since last clinic visit? No   Unplanned office visit, urgent care, ED, or hospital admission in the last 4 weeks? No   How does patient/caregiver feel medication is working? Good   Financial problems or insurance changes? No   Would patient like to speak to a pharmacist? No   When does the patient need to receive the medication? 08/23/23   Refill Delivery Questions    How will the patient receive the medication? Mail   When does the patient need to receive the medication? 08/23/23   Shipping Address Home   Address in [facility] confirmed and updated if neccessary? Yes   Expected Copay ($) 0   Is the patient able to afford the medication copay? Yes   Payment Method zero copay   Days supply of Refill 28   Supplies needed? No supplies needed   Refill activity completed? Yes   Refill activity plan Refill scheduled   Shipment/Pickup Date: 08/21/23            Current Outpatient Medications   Medication Sig    acetaminophen (TYLENOL EXTRA STRENGTH) 500 MG tablet Take 1,000 mg by mouth every 6 (six) hours as needed for Pain.    ESTARYLLA 0.25-35 mg-mcg per tablet Take 1 tablet by mouth once daily.    ferrous sulfate 325 (65 FE) MG EC tablet Take 325 mg by mouth 3 (three) times daily with meals.    folic acid (FOLVITE) 1 MG tablet TAKE 1 TABLET(1 MG) BY MOUTH EVERY DAY    methIMAzole (TAPAZOLE) 2.5 mg Tab     methotrexate  2.5 MG Tab Take 10 tablets (25 mg total) by mouth every 7 days.    tocilizumab (ACTEMRA ACTPEN) 162 mg/0.9 mL PnIj Inject 162 mg into the skin every 7 days.   Last reviewed on 7/19/2023  2:56 PM by Gina Geronimo MA    Review of patient's allergies indicates:   Allergen Reactions    Lavender Hives and Shortness Of Breath    Last reviewed on  7/19/2023 2:55 PM by Gina Geronimo      Tasks added this encounter   No tasks added.   Tasks due within next 3 months   8/20/2023 - Refill Coordination Outreach (1 time occurrence)     [facility]  [address] [facility] [address]  Phone: [phone]  Fax: [phone]

## 2023-09-11 ENCOUNTER — SPECIALTY PHARMACY (OUTPATIENT)
Dept: PHARMACY | Facility: CLINIC | Age: 33
End: 2023-09-11

## 2023-09-11 NOTE — TELEPHONE ENCOUNTER
Specialty Pharmacy - Refill Coordination    Specialty Medication Orders Linked to Encounter      Flowsheet Row Most Recent Value   Medication #1 tocilizumab (ACTEMRA ACTPEN) 162 mg/0.9 mL PnIj (Order#[id number], Rx#[id number])            Refill Questions - Documented Responses      Flowsheet Row Most Recent Value   Patient Availability and HIPAA Verification    Does patient want to proceed with activity? Yes   HIPAA/medical authority confirmed? Yes   Relationship to patient of person spoken to? Self   Refill Screening Questions    Changes to allergies? No   Changes to medications? No   New conditions since last clinic visit? No   Unplanned office visit, urgent care, ED, or hospital admission in the last 4 weeks? No   How does patient/caregiver feel medication is working? Good   Financial problems or insurance changes? No   How many doses of your specialty medications were missed in the last 4 weeks? 0   Would patient like to speak to a pharmacist? No   When does the patient need to receive the medication? 09/20/23   Refill Delivery Questions    How will the patient receive the medication? Mail   When does the patient need to receive the medication? 09/20/23   Shipping Address Home   Address in [facility] confirmed and updated if neccessary? Yes   Expected Copay ($) 0   Is the patient able to afford the medication copay? Yes   Payment Method zero copay   Days supply of Refill 28   Supplies needed? No supplies needed   Refill activity completed? Yes   Refill activity plan Refill scheduled   Shipment/Pickup Date: 09/18/23            Current Outpatient Medications   Medication Sig    acetaminophen (TYLENOL EXTRA STRENGTH) 500 MG tablet Take 1,000 mg by mouth every 6 (six) hours as needed for Pain.    ESTARYLLA 0.25-35 mg-mcg per tablet Take 1 tablet by mouth once daily.    ferrous sulfate 325 (65 FE) MG EC tablet Take 325 mg by mouth 3 (three) times daily with meals.    folic acid (FOLVITE) 1 MG tablet TAKE 1  TABLET(1 MG) BY MOUTH EVERY DAY    methIMAzole (TAPAZOLE) 2.5 mg Tab     methotrexate 2.5 MG Tab Take 10 tablets (25 mg total) by mouth every 7 days.    tocilizumab (ACTEMRA ACTPEN) 162 mg/0.9 mL PnIj Inject 162 mg into the skin every 7 days.   Last reviewed on 7/19/2023  2:56 PM by Gina Geronimo MA    Review of patient's allergies indicates:   Allergen Reactions    Lavender Hives and Shortness Of Breath    Last reviewed on  7/19/2023 2:55 PM by Gina Geronimo      Tasks added this encounter   No tasks added.   Tasks due within next 3 months   9/11/2023 - Refill Coordination Outreach (1 time occurrence)     [facility]  [address] [place] [id number]  Phone: [phone]  Fax: [phone]

## 2023-11-30 PROBLEM — M06.9 CHRONIC RHEUMATIC ARTHRITIS: Status: ACTIVE | Noted: 2023-11-30
